# Patient Record
Sex: MALE | Race: WHITE | URBAN - METROPOLITAN AREA
[De-identification: names, ages, dates, MRNs, and addresses within clinical notes are randomized per-mention and may not be internally consistent; named-entity substitution may affect disease eponyms.]

---

## 2023-03-15 ENCOUNTER — OFFICE VISIT (OUTPATIENT)
Dept: DERMATOLOGY | Age: 48
End: 2023-03-15
Payer: COMMERCIAL

## 2023-03-15 DIAGNOSIS — D17.1 LIPOMA OF SKIN OF ABDOMEN: ICD-10-CM

## 2023-03-15 DIAGNOSIS — L57.0 ACTINIC KERATOSIS: Primary | ICD-10-CM

## 2023-03-15 PROCEDURE — 99213 OFFICE O/P EST LOW 20 MIN: CPT | Performed by: DERMATOLOGY

## 2023-03-15 PROCEDURE — G8421 BMI NOT CALCULATED: HCPCS | Performed by: DERMATOLOGY

## 2023-03-15 PROCEDURE — 1036F TOBACCO NON-USER: CPT | Performed by: DERMATOLOGY

## 2023-03-15 PROCEDURE — G8484 FLU IMMUNIZE NO ADMIN: HCPCS | Performed by: DERMATOLOGY

## 2023-03-15 PROCEDURE — G8427 DOCREV CUR MEDS BY ELIG CLIN: HCPCS | Performed by: DERMATOLOGY

## 2023-03-15 RX ORDER — FLUOROURACIL 50 MG/G
CREAM TOPICAL
Qty: 40 G | Refills: 0 | Status: SHIPPED | OUTPATIENT
Start: 2023-03-15

## 2023-03-15 RX ORDER — INSULIN DEGLUDEC 200 U/ML
INJECTION, SOLUTION SUBCUTANEOUS
COMMUNITY
Start: 2023-02-15

## 2023-06-21 ENCOUNTER — OFFICE VISIT (OUTPATIENT)
Dept: DERMATOLOGY | Age: 48
End: 2023-06-21
Payer: COMMERCIAL

## 2023-06-21 DIAGNOSIS — L57.0 ACTINIC KERATOSIS: Primary | ICD-10-CM

## 2023-06-21 PROCEDURE — G8427 DOCREV CUR MEDS BY ELIG CLIN: HCPCS | Performed by: DERMATOLOGY

## 2023-06-21 PROCEDURE — G8421 BMI NOT CALCULATED: HCPCS | Performed by: DERMATOLOGY

## 2023-06-21 PROCEDURE — 1036F TOBACCO NON-USER: CPT | Performed by: DERMATOLOGY

## 2023-06-21 PROCEDURE — 99213 OFFICE O/P EST LOW 20 MIN: CPT | Performed by: DERMATOLOGY

## 2023-06-21 NOTE — PROGRESS NOTES
[facility] Dermatology  Echo Jordan MD  [phone]      Pardeep Gray  1975    50 y.o. male     Date of Visit: 6/21/2023    Chief Complaint: actinic keratoses    History of Present Illness:    He returns today to follow up for actinic keratoses of the vertex scalp and left temple. He used Efudex cream twice daily for 2 weeks. He had more irritation on the left temple than the scalp. Dermatologic history:     Has hx of likely AKs - treated with cryotherapy. Reports hx of lipomas - few removed in the past.       Review of Systems:  Gen: Feels well, good sense of health. Past Medical History, Family History, Surgical History, Medications and Allergies reviewed. Past Medical History:   Diagnosis Date    Diabetes ([address]     Hypertension      History reviewed. No pertinent surgical history. No Known Allergies  Outpatient Medications Marked as Taking for the 6/21/23 encounter (Office Visit) with Kevin Zepeda MD   Medication Sig Dispense Refill    TRESIBA FLEXTOUCH 200 UNIT/ML SOPN USE AS DIRECTED UP TO MAX DAILY DOSE  UNITS. fluorouracil (EFUDEX) 5 % cream Apply topically 2 times daily for 2 weeks to the top of the scalp and left temple. 40 g 0    atorvastatin (LIPITOR) 40 MG tablet TAKE 1 TABLET DAILY      glipiZIDE (GLUCOTROL XL) 5 MG extended release tablet TAKE 1 TABLET DAILY      losartan (COZAAR) 100 MG tablet Take 1 tablet by mouth daily      metFORMIN (GLUCOPHAGE) 500 MG tablet Take 1 tablet by mouth See Admin Instructions      Blood Glucose Monitoring Suppl (ACCU-CHEK AVERY PLUS) w/Device KIT Check sugar once daily- non insulin type 2    Please dispense whichever brand the insurance prefers      Lancets Misc. 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 Went to [employer]. Kids went to [employer].       Physical

## 2024-02-07 ENCOUNTER — TELEPHONE (OUTPATIENT)
Dept: DERMATOLOGY | Age: 49
End: 2024-02-07

## 2024-02-07 NOTE — TELEPHONE ENCOUNTER
Pts wife calling to see if we are willing to take her on as a pt her Dermatologist is no longer in practice. [phone]

## 2024-02-08 NOTE — TELEPHONE ENCOUNTER
Called and spoke to patient's wife, Shasha and advised her that none of the physicians in our practice are accepting new patients.   She verbalized understanding.   Offered to provide information to other dermatology practices but she said she already had the information to some other office that she was going to reach out to.

## 2024-03-20 ENCOUNTER — OFFICE VISIT (OUTPATIENT)
Dept: DERMATOLOGY | Age: 49
End: 2024-03-20
Payer: COMMERCIAL

## 2024-03-20 DIAGNOSIS — L82.1 SK (SEBORRHEIC KERATOSIS): ICD-10-CM

## 2024-03-20 DIAGNOSIS — L24.9 IRRITANT DERMATITIS: ICD-10-CM

## 2024-03-20 DIAGNOSIS — L57.0 ACTINIC KERATOSIS: Primary | ICD-10-CM

## 2024-03-20 PROCEDURE — 99213 OFFICE O/P EST LOW 20 MIN: CPT | Performed by: DERMATOLOGY

## 2024-03-20 RX ORDER — TRIAMCINOLONE ACETONIDE 1 MG/G
CREAM TOPICAL
Qty: 30 G | Refills: 2 | Status: SHIPPED | OUTPATIENT
Start: 2024-03-20

## 2024-03-20 RX ORDER — LOSARTAN POTASSIUM AND HYDROCHLOROTHIAZIDE 25; 100 MG/1; MG/1
1 TABLET ORAL DAILY
COMMUNITY
Start: 2024-01-11

## 2024-03-20 RX ORDER — FLUOROURACIL 50 MG/G
CREAM TOPICAL
Qty: 40 G | Refills: 0 | Status: SHIPPED | OUTPATIENT
Start: 2024-03-20

## 2024-03-20 NOTE — PROGRESS NOTES
[facility] Dermatology  Vick Monet MD  [phone]      Pardeep Gray  1975    48 y.o. male     Date of Visit: 3/20/2024    Chief Complaint: skin lesions    History of Present Illness:    1.  He returns today to follow up for a history of AKs.  Most recently he treated his scalp for about 4 weeks with Efudex cream with some improvement.    2.  He also complains of a markedly pruritic eruption in the axillae.    3.  He reports a persistent growth on the left thigh.      Review of Systems:  Gen: Feels well, good sense of health.    Past Medical History, Family History, Surgical History, Medications and Allergies reviewed.    Past Medical History:   Diagnosis Date    Diabetes (HCC)     Hypertension      History reviewed. No pertinent surgical history.    No Known Allergies  Outpatient Medications Marked as Taking for the 3/20/24 encounter (Office Visit) with Vick Monet MD   Medication Sig Dispense Refill    losartan-hydroCHLOROthiazide (HYZAAR) 100-25 MG per tablet Take 1 tablet by mouth daily      Tirzepatide (MOUNJARO) 5 MG/0.5ML SOPN SC injection Inject 0.5 mLs into the skin once a week      triamcinolone (KENALOG) 0.1 % cream Apply to affected areas of the skin twice daily for up to 2 weeks or until improved. 30 g 2    fluorouracil (EFUDEX) 5 % cream Apply to the top of the scalp and nose 2 times daily for 14 days. 40 g 0    TRESIBA FLEXTOUCH 200 UNIT/ML SOPN USE AS DIRECTED UP TO MAX DAILY DOSE  UNITS.      atorvastatin (LIPITOR) 40 MG tablet TAKE 1 TABLET DAILY      glipiZIDE (GLUCOTROL XL) 5 MG extended release tablet TAKE 1 TABLET DAILY      losartan (COZAAR) 100 MG tablet Take 1 tablet by mouth daily      metFORMIN (GLUCOPHAGE) 500 MG tablet Take 1 tablet by mouth See Admin Instructions      Blood Glucose Monitoring Suppl (ACCU-CHEK AVERY PLUS) w/Device KIT Check sugar once daily- non insulin type 2    Please dispense whichever brand the insurance prefers      Lancets Misc.

## 2025-03-26 ENCOUNTER — OFFICE VISIT (OUTPATIENT)
Age: 50
End: 2025-03-26
Payer: COMMERCIAL

## 2025-03-26 DIAGNOSIS — L57.0 ACTINIC KERATOSIS: Primary | ICD-10-CM

## 2025-03-26 DIAGNOSIS — L82.1 SK (SEBORRHEIC KERATOSIS): ICD-10-CM

## 2025-03-26 PROCEDURE — 99213 OFFICE O/P EST LOW 20 MIN: CPT | Performed by: DERMATOLOGY

## 2025-03-26 NOTE — PROGRESS NOTES
[facility] Dermatology  Vick Monet MD  [phone]      Pardeep Gray  1975    49 y.o. male     Date of Visit: 3/26/2025    Chief Complaint: skin lesions    History of Present Illness:    1.  He returns today to follow-up for history of actinic keratoses on the scalp and nose.  He reports improvement with use of Efudex cream in January of this year.    2.  He reports a stable growth on the left thigh.    Review of Systems:  Gen: Feels well, good sense of health.    Past Medical History, Family History, Surgical History, Medications and Allergies reviewed.    Past Medical History:   Diagnosis Date    Diabetes (HCC)     Hypertension      History reviewed. No pertinent surgical history.    No Known Allergies  Outpatient Medications Marked as Taking for the 3/26/25 encounter (Office Visit) with Vick Monet MD   Medication Sig Dispense Refill    losartan-hydroCHLOROthiazide (HYZAAR) 100-25 MG per tablet Take 1 tablet by mouth daily      Tirzepatide (MOUNJARO) 5 MG/0.5ML SOPN SC injection Inject 0.5 mLs into the skin once a week      triamcinolone (KENALOG) 0.1 % cream Apply to affected areas of the skin twice daily for up to 2 weeks or until improved. 30 g 2    fluorouracil (EFUDEX) 5 % cream Apply to the top of the scalp and nose 2 times daily for 14 days. 40 g 0    TRESIBA FLEXTOUCH 200 UNIT/ML SOPN USE AS DIRECTED UP TO MAX DAILY DOSE  UNITS.      fluorouracil (EFUDEX) 5 % cream Apply topically 2 times daily for 2 weeks to the top of the scalp and left temple. 40 g 0    atorvastatin (LIPITOR) 40 MG tablet TAKE 1 TABLET DAILY      metFORMIN (GLUCOPHAGE) 500 MG tablet Take 1 tablet by mouth See Admin Instructions      Blood Glucose Monitoring Suppl (ACCU-CHEK AVERY PLUS) w/Device KIT Check sugar once daily- non insulin type 2    Please dispense whichever brand the insurance prefers      Lancets Misc. (ACCU-CHEK SOFTCLIX LANCET DEV) KIT Check sugar daily/dispense any brand insurance